# Patient Record
Sex: MALE | Race: WHITE | HISPANIC OR LATINO | URBAN - METROPOLITAN AREA
[De-identification: names, ages, dates, MRNs, and addresses within clinical notes are randomized per-mention and may not be internally consistent; named-entity substitution may affect disease eponyms.]

---

## 2017-01-01 ENCOUNTER — EMERGENCY (EMERGENCY)
Facility: HOSPITAL | Age: 27
LOS: 1 days | Discharge: HOME | End: 2017-01-01
Attending: EMERGENCY MEDICINE | Admitting: EMERGENCY MEDICINE
Payer: MEDICAID

## 2017-01-01 VITALS
DIASTOLIC BLOOD PRESSURE: 93 MMHG | SYSTOLIC BLOOD PRESSURE: 126 MMHG | OXYGEN SATURATION: 100 % | RESPIRATION RATE: 16 BRPM | TEMPERATURE: 99 F | HEART RATE: 88 BPM

## 2017-01-01 PROBLEM — Z00.00 ENCOUNTER FOR PREVENTIVE HEALTH EXAMINATION: Status: ACTIVE | Noted: 2017-01-01

## 2017-01-01 LAB
APPEARANCE UR: SIGNIFICANT CHANGE UP
BACTERIA # UR AUTO: SIGNIFICANT CHANGE UP
BILIRUB UR-MCNC: NEGATIVE — SIGNIFICANT CHANGE UP
BLOOD UR QL VISUAL: HIGH
COLOR SPEC: HIGH
GLUCOSE UR-MCNC: 50 — SIGNIFICANT CHANGE UP
KETONES UR-MCNC: SIGNIFICANT CHANGE UP
LEUKOCYTE ESTERASE UR-ACNC: SIGNIFICANT CHANGE UP
MUCOUS THREADS # UR AUTO: SIGNIFICANT CHANGE UP
NITRITE UR-MCNC: NEGATIVE — SIGNIFICANT CHANGE UP
PH UR: 6.5 — SIGNIFICANT CHANGE UP (ref 4.6–8)
PROT UR-MCNC: >600 — SIGNIFICANT CHANGE UP
RBC CASTS # UR COMP ASSIST: >50 — HIGH (ref 0–?)
SP GR SPEC: 1.04 — HIGH (ref 1–1.03)
SQUAMOUS # UR AUTO: SIGNIFICANT CHANGE UP
UROBILINOGEN FLD QL: NORMAL E.U. — SIGNIFICANT CHANGE UP (ref 0.1–0.2)
WBC UR QL: SIGNIFICANT CHANGE UP (ref 0–?)

## 2017-01-01 PROCEDURE — 99283 EMERGENCY DEPT VISIT LOW MDM: CPT

## 2017-01-01 RX ORDER — CEPHALEXIN 500 MG
500 CAPSULE ORAL ONCE
Qty: 0 | Refills: 0 | Status: COMPLETED | OUTPATIENT
Start: 2017-01-01 | End: 2017-01-01

## 2017-01-01 RX ORDER — CEPHALEXIN 500 MG
10 CAPSULE ORAL
Qty: 140 | Refills: 0 | OUTPATIENT
Start: 2017-01-01 | End: 2017-01-08

## 2017-01-01 RX ORDER — CEPHALEXIN 500 MG
1 CAPSULE ORAL
Qty: 14 | Refills: 0 | OUTPATIENT
Start: 2017-01-01 | End: 2017-01-08

## 2017-01-01 RX ADMIN — Medication 500 MILLIGRAM(S): at 23:26

## 2017-01-01 NOTE — ED PROVIDER NOTE - PROGRESS NOTE DETAILS
The scribe's documentation has been prepared under my direction and personally reviewed by me in its entirety. I confirm that the note above accurately reflects all work, treatment, procedures, and medical decision making performed by me.  HERNANDEZ NARANJO hematuria and leuk esterase present in urine. urine culture sent and urine gc/chlamydia sent. patient dc with mother on keflex 500mg BID for 7 days. follow up line notified to follow up culture and gc/chlamydia and have social work follow up if positive. PCP and  follow up recommended

## 2017-01-01 NOTE — ED PROVIDER NOTE - NS ED MD SCRIBE ATTENDING SCRIBE SECTIONS
VITAL SIGNS( Pullset)/REVIEW OF SYSTEMS/PHYSICAL EXAM/HISTORY OF PRESENT ILLNESS/HIV/DISPOSITION/PAST MEDICAL/SURGICAL/SOCIAL HISTORY

## 2017-01-01 NOTE — ED PROVIDER NOTE - GENITOURINARY [-], MLM
no difficulty urinating/no urine output increased/no urinary hesitancy/no cloudy urine/no frequency/no urine output decreased/no strong smelling urine/no urgency

## 2017-01-01 NOTE — ED PROVIDER NOTE - ATTENDING CONTRIBUTION TO CARE
ED Attending (Kiel PADILLA): I have personally performed a face to face bedside history and physical examination of this patient. I have discussed the history, examination, assessment and plan of management with the Physician Assistant. My findings include: 26 year old male with past medical history of MR, Spina Bifida, Autism is brought in by mother today after noticing blood in patient's diaper and dysuria for few days. Denies any known trauma, fever, chills, nausea, vomiting, diarrhea, abdominal pain, chest pain, shortness of breath, social history, or any other complaints. Per family, patient's behavior is at his baseline. Mother states had been with his father recently but father denied any trauma or unusual activities or injuries.  On exam, patient calm and cooperative, in no distress. he is unable to give history. abdomen soft non tender, No CVAT. Genital exam: no evidence of trauma, Non circumcised, easily retractable foreskin, no lesions on penis glans or scrotom, no discharge, testes normal, no masses , perineum normal, rectal area no trauma or lesions, digital rectal exam normal no tenderness, lesions, injuries or blood noted. Mother present at bedside for entire exam. We will attempt to get UA to check for infection/hematuria.

## 2017-01-01 NOTE — ED ADULT TRIAGE NOTE - CHIEF COMPLAINT QUOTE
Pt. noted with blood in diaper yesterday; worst today with burning and pain as per mom. Pt. with hx of MR.

## 2017-01-01 NOTE — ED PROVIDER NOTE - OBJECTIVE STATEMENT
26 year old male with past medical history of MR, Spina Bifida, Autism is brought in by mother today after noticing blood in patient's diaper and dysuria for few days. Denies any trauma, fever, chills, nausea, vomiting, diarrhea, abdominal pain, chest pain, shortness of breath, social history, or any other complaints. Per family, patient's behavior is at his baseline. 26 year old male with past medical history of MR, Spina Bifida, Autism is brought in by mother today after noticing blood in patient's diaper and dysuria for few days. Denies any known trauma, fever, chills, nausea, vomiting, diarrhea, abdominal pain, chest pain, shortness of breath, social history, or any other complaints. Per family, patient's behavior is at his baseline.

## 2017-01-03 LAB
BACTERIA UR CULT: SIGNIFICANT CHANGE UP
SPECIMEN SOURCE: SIGNIFICANT CHANGE UP

## 2017-01-03 NOTE — ED POST DISCHARGE NOTE - REASON FOR FOLLOW-UP
Other Telephone follow up  request : Dr Fisher requesting we follow up with patient who has severe MR lives with mother. Patient was by his father and now has hematuria. Dr fisher treating for a UTI ( no known trama) patient unable to speak. Dr fisher also tested for GC/Chlamydia ( no treatment). If GC or Chlamydia positive patient needs treatment and Socila worker needs to be contacted to investigate. Patient discharged home with a prescription for Keflex.

## 2017-01-04 LAB
C TRACH RRNA SPEC QL NAA+PROBE: NOT DETECTED — SIGNIFICANT CHANGE UP
C TRACH RRNA SPEC QL NAA+PROBE: SIGNIFICANT CHANGE UP
GC AMPLIFICATION INTERPRETATION: SIGNIFICANT CHANGE UP
N GONORRHOEA RRNA SPEC QL NAA+PROBE: NOT DETECTED — SIGNIFICANT CHANGE UP
SPECIMEN SOURCE: SIGNIFICANT CHANGE UP

## 2019-02-06 NOTE — ED PROVIDER NOTE - DISCHARGE DATE
Pt calls requesting all meds other than benztropine and benztropine be removed from med list as he is not taking them.This is done.  
01-Jan-2017

## 2022-10-14 ENCOUNTER — EMERGENCY (EMERGENCY)
Facility: HOSPITAL | Age: 32
LOS: 1 days | Discharge: HOME | End: 2022-10-14
Attending: EMERGENCY MEDICINE | Admitting: EMERGENCY MEDICINE

## 2022-10-14 VITALS
RESPIRATION RATE: 18 BRPM | HEART RATE: 104 BPM | OXYGEN SATURATION: 98 % | DIASTOLIC BLOOD PRESSURE: 82 MMHG | SYSTOLIC BLOOD PRESSURE: 116 MMHG | TEMPERATURE: 100 F

## 2022-10-14 VITALS
TEMPERATURE: 98 F | SYSTOLIC BLOOD PRESSURE: 123 MMHG | DIASTOLIC BLOOD PRESSURE: 84 MMHG | RESPIRATION RATE: 20 BRPM | HEART RATE: 111 BPM

## 2022-10-14 PROBLEM — F79 UNSPECIFIED INTELLECTUAL DISABILITIES: Chronic | Status: ACTIVE | Noted: 2017-01-01

## 2022-10-14 PROBLEM — Q05.9 SPINA BIFIDA, UNSPECIFIED: Chronic | Status: ACTIVE | Noted: 2017-01-01

## 2022-10-14 PROCEDURE — 99283 EMERGENCY DEPT VISIT LOW MDM: CPT

## 2022-10-14 NOTE — ED ADULT NURSE NOTE - NSIMPLEMENTINTERV_GEN_ALL_ED
Implemented All Fall with Harm Risk Interventions:  [place] to call system. Call bell, personal items and telephone within reach. Instruct patient to call for assistance. Room bathroom lighting operational. Non-slip footwear when patient is off stretcher. Physically safe environment: no spills, clutter or unnecessary equipment. Stretcher in lowest position, wheels locked, appropriate side rails in place. Provide visual cue, wrist band, yellow gown, etc. Monitor gait and stability. Monitor for mental status changes and reorient to person, place, and time. Review medications for side effects contributing to fall risk. Reinforce activity limits and safety measures with patient and family. Provide visual clues: red socks.

## 2022-10-14 NOTE — ED PROVIDER NOTE - OBJECTIVE STATEMENT
32yoM w/Hx of spina bifida, autism p/w R gluteal fold wound for 7 mo. Hx provided by father at bedside. Pt has had 7mo of nonhealing ulcer on R buttcheek, wound care provided at home by father and mother (, dad 3 weeks on, mom 1 week on) 32yoM w/Hx of spina bifida, autism p/w R gluteal fold wound for 7 mo. Hx provided by father at bedside. Pt has had 7mo of nonhealing ulcer on R buttcheek, wound care provided at home by father and mother (, dad 3 weeks on, mom 1 week on) with good healing however wound is still open so dad came to ED for evaluation. Limited ROS 2/2 patient mentation; dad denies fever/chills, pus/discharge from site, bleeding, vomiting, skin changes/rash, fainting, diarrhea.

## 2022-10-14 NOTE — ED PROVIDER NOTE - NS ED ROS FT
GENERAL: No fever or chills, EYES: no change in vision, HEENT: no trouble swallowing or speaking, CARDIAC: no chest pain, PULMONARY: no cough or SOB, GI: no abdominal pain, no nausea, no vomiting, no diarrhea or constipation, : No changes in urination, SKIN: no rashes, +ULCER; NEURO: no headache,  MSK: No joint pain     All other ROS negative unless otherwise specified in HPI.     ~Heide Oviedo M.D. Resident

## 2022-10-14 NOTE — ED PROVIDER NOTE - ATTENDING CONTRIBUTION TO CARE
32yoM w/Hx of spina bifida, autism p/w R gluteal fold wound for 7 mo. Hx provided by father at bedside. Pt has had 7mo of nonhealing ulcer on R buttcheek, wound care provided at home by father and mother (, dad 3 weeks on, mom 1 week on) with good healing however wound is still open so dad came to ED for evaluation. Limited ROS 2/2 patient mentation; dad denies fever/chills, pus/discharge from site, bleeding, vomiting, skin changes/rash, fainting, diarrhea.

## 2022-10-14 NOTE — ED ADULT NURSE NOTE - OBJECTIVE STATEMENT
pt to rm 25. awake. alert. pt with dad at bedside. hx autism ,spina bifida. pt repeats questions  verbatim when spolen to. calm at present. denies pain- father states pt unable to understand questions. awaits md wood. denies fever- father states pt with " ulcer to buttock present x months- not getting better"   will continue to monitor, maintain safety. pt to rm 27. awake. alert. pt with dad at bedside. hx autism ,spina bifida. pt repeats questions  verbatim when spolen to. calm at present. denies pain- father states pt unable to understand questions. awaits md wood. denies fever- father states pt with " ulcer to buttock present x months- not getting better"   will continue to monitor, maintain safety.  pt noted with open  ulcer gluteal fold= 3 cm.no exudate,swelling,increased redness noted. area cleaned,dsd applied. md to evaluate pt to rm 25. awake. alert. pt with dad at bedside. hx autism ,spina bifida. pt repeats questions  verbatim when spoken to. calm at present. denies pain- father states pt unable to understand questions. awaits md wood. denies fever- father states pt with " ulcer to buttock present x months- not getting better"   will continue to monitor, maintain safety.  pt noted with open  ulcer r  gluteal fold= 3 cm. no exudate ,swelling, increased redness noted. area cleaned, dsd applied. md to evaluate

## 2022-10-14 NOTE — ED PROVIDER NOTE - CLINICAL SUMMARY MEDICAL DECISION MAKING FREE TEXT BOX
32yoM w/Hx spina bifida, autism p/w 3cm open wound on R gluteal fold for 7mo, vs unremarkable, phys exam showing clean wound w/o signs of infection or cellulitis/abscess. [facility] and [facility] referral. - Heide Oviedo, PGY-2

## 2022-10-14 NOTE — ED PROVIDER NOTE - NSFOLLOWUPINSTRUCTIONS_ED_ALL_ED_FT
Please follow up with a wound care specialist within 1 week. Bring copies of your results with you (provided in your discharge paperwork).     •Keep the wound clean and dry.  •Change any dressings as told by your health care provider. This includes changing the dressing when it starts to smell, or when it gets wet or dirty.    •Clean the wound one time each day, or as often as told by your health care provider. To clean your wound:  1.Wash your hands with soap and water for at least 20 seconds before and after touching your wound or changing your dressing. If soap and water are not available, use hand .  2.Remove any dressing as told by your health care provider.  3.Clean the wound with water or irrigation solution as told by your health care provider.  4.Pat the wound dry with a clean towel. Do not rub the wound.  5.Apply a thin layer of antibiotic ointment or another topical ointment to the wound as told by your health care provider. This will prevent infection and keep the dressing from sticking to the wound.  6.Apply a new dressing as told by your health care provider.    •Check your wound every day for signs of infection. Watch for:  •More redness, swelling, or pain.  •Fluid or blood.  •Warmth.  •Pus or a bad smell.  •Do not take baths, swim, or do anything that puts your wound underwater until your health care provider approves.  •Do not scratch or pick at the wound.  •Do not use disinfectants or antiseptics, such as rubbing alcohol, to clean your wound unless told by your health care provider.    Follow these instructions at home:    Medicines   •Take over-the-counter and prescription medicines only as told by your health care provider.  •If you were prescribed an antibiotic medicine, take or apply it as told by your health care provider. Do not stop using the antibiotic even if your condition improves.    Managing pain and swelling   •If directed, put ice on the injured area. To do this:  •Put ice in a plastic bag.  •Place a towel between your skin and the bag.  •Leave the ice on for 20 minutes, 2–3 times a day.  •Remove the ice if your skin turns bright red. This is very important. If you cannot feel pain, heat, or cold, you have a greater risk of damage to the area.  •Raise (elevate) the injured area above the level of your heart while you are sitting or lying down.    General instructions   •Avoid any activity that could cause your laceration to reopen.  •Keep all follow-up visits. This is important.    Contact a health care provider if:  •You received a tetanus shot and you have swelling, severe pain, redness, or bleeding at the injection site.  •Your pain is not controlled with medicine.    •You have any of these signs of infection:  •More redness, swelling, or pain around your wound.  •Fluid or blood coming from your wound.  •Warmth coming from your wound.  •Pus or a bad smell coming from your wound.  •A fever.  •You notice something coming out of the wound, such as wood or glass.  •You notice a change in the color of your skin near your wound.  •You develop a new rash.  •You need to change the dressing often.  •You develop numbness around your wound.    Get help right away if:  •Your pain suddenly increases and is severe.  •You develop severe swelling around the wound.  •The wound is on your hand or foot, and you cannot properly move a finger or toe.  •The wound is on your hand or foot, and you notice that your fingers or toes look pale or bluish.  •You have a red streak going away from your wound.  •You develop painful lumps near the wound or on skin anywhere else on your body.    Summary  •A laceration is a cut that may go through all layers of the skin and into the tissue that is right under the skin. It is usually closed with stitches, tape, or skin glue shortly after the injury happens.  •If a wound is dirty or if several hours pass before medical treatment is provided, the laceration may be kept open (nonsutured) and covered with a bandage.  •Nonsutured laceration helps prevent infection and allows the wound to heal from the deepest layer of tissue damage up to the surface.  •Follow instructions from your health care provider about how to take care of your wound.

## 2022-10-14 NOTE — ED PROVIDER NOTE - PATIENT PORTAL LINK FT
You can access the FollowMyHealth Patient Portal offered by [facility] by registering at the following website: [email or web address]. By joining [facility] [employer]’s FollowMyHealth portal, you will also be able to view your health information using other applications (apps) compatible with our system.

## 2022-10-14 NOTE — ED PROVIDER NOTE - NSFOLLOWUPCLINICS_GEN_ALL_ED_FT
[facility]  General Surgery  [address] [place] [address] - [address] [place] [address]  Phone: [phone]  Fax:

## 2022-10-14 NOTE — ED PROVIDER NOTE - PHYSICAL EXAMINATION
Gen: non-toxic WDWN male sitting in bed in NAD  Head: NCAT  HEENT: EOMI, oral mucosa moist, normal conjunctiva  Lung: CTAB, no respiratory distress, no wheezes/rhonchi/rales B/L, speaking in full sentences  CV: RRR, no murmurs, rubs or gallops  Abd: soft, NTND, no guarding, no CVA tenderness  MSK: no visible deformities  Neuro: No focal sensory or motor deficits  Skin: Warm, well perfused, no rash; +3cm linear c/d/i wound w/clean bases, no erythema/tendernesss/purulence/crepitus/fluctuance/bleeding/edema  Psych: MR  ~Heide Oveido M.D. Resident

## 2022-11-28 NOTE — ED ADULT NURSE NOTE - NS ED NURSE DISCH DISPOSITION
Marcus Godfrey)  Urology  [address] [facility], [address] [place] [address]  Phone: [phone]  Fax: [phone]  Follow Up Time:   
Discharged

## 2024-05-01 NOTE — ED PROVIDER NOTE - DISCUSSED CLINICAL AND RADIOLOGICAL FINDINGS WITH, MDM
[Follow-Up] : a follow-up visit
[Asthma] : asthma
[Sleep Apnea] : sleep apnea
[Cough] : cough
[COPD] : COPD
[Shortness of Breath] : shortness of breath
[Wheezing] : wheezing
Home
patient